# Patient Record
(demographics unavailable — no encounter records)

---

## 2025-07-07 NOTE — ASSESSMENT
[FreeTextEntry1] : The condition was explained to the patient.  Given residual displacement/angulation of fracture and unstable nature of injury, recommend f/u with Pediatric Orthopedist for management - contact information provided. They expressed understanding and will call to schedule appointment.  - maintain long arm cast. - keep forearm elevated above heart as much as possible to reduce swelling. - encouraged HEP (making a fist) to reduce stiffness.

## 2025-07-07 NOTE — HISTORY OF PRESENT ILLNESS
[de-identified] : 7/7/25: HPI obtained from patient's parent as independent historian due to patient's age making them an unreliable historian.  9yo male (LHD) presents for RIGHT forearm pain after a trip and fall playing soccer on 7/3/25. Went to [facility] on [place] => XR showed both bone forearm fx, which was reduced and placed in a long arm cast and sling. [FreeTextEntry5] : PALOMA hernandez [LHD] 10 year old male is here today for evaluation of RIGHT forearm after being tripped and fell while playing soccer on 07/03/25. went to [facility] on [place] +xrays (pre/post reduction) and LAC.

## 2025-07-07 NOTE — IMAGING
[de-identified] : RIGHT HAND long arm cast intact.  mild swelling of hand. good EPL, limited FPL. fingers good extension, flex to half-fist actively, flex to full fist passively.  SILT to median, ulnar, radial distribution. brisk cap refill all digits. no triggering.  @[facility] 7/3/25 XRAYS OF RIGHT FOREARM (2 views - PA AND LATERAL VIEWS): displaced radial shaft fx and ulnar shaft fx. open physes. @[facility] 7/3/25 XRAYS OF RIGHT FOREARM (2 views - PA AND LATERAL VIEWS): in cast. interval improvement in alignment with residual angulation of radial shaft fx and mild residual displacement of ulnar shaft fx. open physes.

## 2025-07-09 NOTE — REASON FOR VISIT
[Initial Evaluation] : an initial evaluation [Patient] : patient [Parents] : parents [FreeTextEntry1] : right forearm fracture.

## 2025-07-09 NOTE — ASSESSMENT
[FreeTextEntry1] : Both bones forearm fracture right  The history for today's visit was obtained from the child, as well as the parent. The child's history was unreliable alone due to age and therefore, the parent was used today as an independent historian.  7/5/25 pre and post reduction xrays reviewed revealing midshaft both bones fracture in acceptable alignment. Mild angulation of the radius on lateral view but acceptable. Anatomic alignment on AP view post reduction.  The xrays were discussed with parents and patient. The alignment is acceptable at this time. Continued monitoring for change in alignment is recommended. he will f/u in 1 week for xrays in the cast to check for any change in alignment.  He will remain in the LAC for approx 4 weeks and then transition to a SAC for an additional 3 weeks and then possible fracture brace. Risk of refracture in forearm fractures discussed. Out of sport usually approx 3 months from injury  No gym or sports Cast care instructions he is encouraged to do ROM of the fingers  All questions answered. Parent in agreement with the plan.  IRosemarie, BLAKE, PAC, have acted as a scribe and documented the above for Dr. Colon.  The above documentation completed by the scribe is an accurate record of both my words and actions.  KHURRAM

## 2025-07-09 NOTE — DATA REVIEWED
[de-identified] : 7/5/25 pre and post reduction xrays reviewed revealing midshaft both bones fracture in acceptable alignment. Mild angulation of the radius on lateral view but acceptable. Anatomic alignment on AP view post reduction.

## 2025-07-09 NOTE — HISTORY OF PRESENT ILLNESS
[0] : currently ~his/her~ pain is 0 out of 10 [FreeTextEntry1] : Almost 11-year-old male left-hand-dominant presents with his parents for evaluation of right forearm fracture.  The patient states he was playing soccer on July 5 and fell injuring his right forearm.  He was seen at [facility] where x-rays were taken and he was found to have a displaced midshaft forearm fracture and he underwent closed reduction and application of long-arm cast.  He is doing well in the cast.  No pain medication is needed at this time.  He denies any numbness or tingling or cast issues.  He is here for the first time for evaluation.

## 2025-07-09 NOTE — PHYSICAL EXAM
[FreeTextEntry1] : GAIT: No limp. Good coordination and balance noted. GENERAL: alert, cooperative pleasant young  10 yo male in NAD SKIN: The skin is intact, warm, pink and dry over the area examined. EYES: Normal conjunctiva, normal eyelids and pupils were equal and round. ENT: normal ears, normal nose and normal lips. CARDIOVASCULAR: brisk capillary refill, but no peripheral edema. RESPIRATORY: The patient is in no apparent respiratory distress. They're taking full deep breaths without use of accessory muscles or evidence of audible wheezes or stridor without the use of a stethoscope. Normal respiratory effort. ABDOMEN: not examined   RUE: Cast is present and well fitting Skin is intact to the areas exposed. fingers mobile, slwelling of fingers noted.  sensation grossly intact no pain with passive stretch of the digits. brisk cap refill

## 2025-07-16 NOTE — ASSESSMENT
[FreeTextEntry1] : Both bones forearm fracture right  The history for today's visit was obtained from the child, as well as the parent. The child's history was unreliable alone due to age and therefore, the parent was used today as an independent historian. Xrays AP and lateral of the right forearm in the cast  reveal a midshaft both bones fracture in acceptable alignment which is unchanged from last weeks xrays. Mild angulation of the radius on lateral view but acceptable. Anatomic alignment on AP view post reduction.   The alignment is acceptable at this time. He will f/u in 3 weeks for repeat xrays of the right forearm out of cast. It will be decided based on healing is SAC vs fracture brace will be used. Risk of refracture in forearm fractures discussed. Out of sport usually approx 3 months from injury No gym or sports Cast care instructions he is encouraged to do ROM of the fingers  All questions answered. Parent in agreement with the plan.  Rosemarie MUÑIZ, MPAS, PAC, have acted as a scribe and documented the above for Dr. Colon.  The above documentation completed by the scribe is an accurate record of both my words and actions.  JPD

## 2025-07-16 NOTE — DATA REVIEWED
[de-identified] : Xrays AP and lateral of the right forearm in the cast  reveal a midshaft both bones fracture in acceptable alignment which is unchanged from last weeks xrays. Mild angulation of the radius on lateral view but acceptable. Anatomic alignment on AP view post reduction.

## 2025-07-16 NOTE — HISTORY OF PRESENT ILLNESS
[0] : currently ~his/her~ pain is 0 out of 10 [FreeTextEntry1] : Almost 11-year-old male left-hand-dominant presents with his parents for f/u of right forearm fracture.  The patient states he was playing soccer on July 5 and fell injuring his right forearm.  He was seen at [facility] where x-rays were taken and he was found to have a displaced midshaft forearm fracture and he underwent closed reduction and application of long-arm cast.  He is doing well in the cast.  No pain medication is needed at this time.  He denies any numbness or tingling or cast issues.  He is here for the repeat xrays in the cast.

## 2025-07-16 NOTE — REASON FOR VISIT
[Follow Up] : a follow up visit [Patient] : patient [Father] : father [FreeTextEntry1] : right forearm fracture.